# Patient Record
Sex: MALE | Race: BLACK OR AFRICAN AMERICAN | NOT HISPANIC OR LATINO | Employment: STUDENT | URBAN - METROPOLITAN AREA
[De-identification: names, ages, dates, MRNs, and addresses within clinical notes are randomized per-mention and may not be internally consistent; named-entity substitution may affect disease eponyms.]

---
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
## 2020-01-28 ENCOUNTER — HOSPITAL ENCOUNTER (EMERGENCY)
Facility: HOSPITAL | Age: 20
Discharge: HOME | End: 2020-01-28
Attending: EMERGENCY MEDICINE | Admitting: EMERGENCY MEDICINE

## 2020-01-28 VITALS
SYSTOLIC BLOOD PRESSURE: 138 MMHG | OXYGEN SATURATION: 100 % | BODY MASS INDEX: 25.19 KG/M2 | WEIGHT: 201.5 LBS | RESPIRATION RATE: 20 BRPM | TEMPERATURE: 100.7 F | HEART RATE: 107 BPM | DIASTOLIC BLOOD PRESSURE: 61 MMHG

## 2020-01-28 DIAGNOSIS — J06.9 VIRAL URI WITH COUGH: Primary | ICD-10-CM

## 2020-01-28 LAB
FLUAV RNA NPH QL NAA+PROBE: NORMAL
FLUBV RNA NPH QL NAA+PROBE: NORMAL
RSV RNA NPH QL NAA+PROBE: NORMAL

## 2020-01-28 PROCEDURE — 99283 EMERGENCY DEPT VISIT LOW MDM: CPT

## 2020-01-28 PROCEDURE — 87631 RESP VIRUS 3-5 TARGETS: CPT

## 2020-01-28 PROCEDURE — 99284 EMERGENCY DEPT VISIT MOD MDM: CPT | Performed by: PHYSICIAN ASSISTANT

## 2020-01-28 RX ORDER — ACETAMINOPHEN 325 MG/1
650 TABLET ORAL ONCE
Status: COMPLETED | OUTPATIENT
Start: 2020-01-28 | End: 2020-01-28

## 2020-01-28 RX ORDER — ONDANSETRON 4 MG/1
4 TABLET, ORALLY DISINTEGRATING ORAL ONCE
Status: COMPLETED | OUTPATIENT
Start: 2020-01-28 | End: 2020-01-28

## 2020-01-28 RX ADMIN — ONDANSETRON 4 MG: 4 TABLET, ORALLY DISINTEGRATING ORAL at 13:27

## 2020-01-28 RX ADMIN — ACETAMINOPHEN 650 MG: 325 TABLET, FILM COATED ORAL at 13:00

## 2020-01-28 NOTE — ED PROVIDER NOTES
History  Chief Complaint   Patient presents with    Flu Symptoms     Patient states this morning woke up with generalized aching, fever and nausea     23year old male presents with flu like symptoms x1 day  He woke up with fever, chills, nausea, and generalized body aches  He did not get the flu shot  No sick contacts  No sore throat  No ear pain  He has nasal congestion  No abdominal pain  No diarrhea or constipation  No headache, dizziness, lightheadedness, weakness  No change in appetite  No recent travel outside of the country  Prior to Admission Medications   Prescriptions Last Dose Informant Patient Reported? Taking?   benzocaine (HURRICAINE) 20 % Not Taking at Unknown time  No No   Si application by Mucosal route 4 (four) times a day as needed for mucositis   Patient not taking: Reported on 2020      Facility-Administered Medications: None       Past Medical History:   Diagnosis Date    Herpes stomatitis     Resolved 2014     Other headache syndrome     Resolved 2014        History reviewed  No pertinent surgical history  Family History   Problem Relation Age of Onset    No Known Problems Mother     No Known Problems Father      I have reviewed and agree with the history as documented  Social History     Tobacco Use    Smoking status: Never Smoker    Smokeless tobacco: Never Used   Substance Use Topics    Alcohol use: Yes     Comment: occassioanl    Drug use: Yes     Comment: synthetic marjuana        Review of Systems   Constitutional: Positive for chills and fever  HENT: Positive for congestion, rhinorrhea and sinus pressure  Negative for ear discharge, ear pain, sinus pain, sneezing and sore throat  Respiratory: Negative for cough and shortness of breath  Cardiovascular: Negative for chest pain, palpitations and leg swelling  Gastrointestinal: Positive for nausea  Negative for abdominal pain, constipation, diarrhea and vomiting     Musculoskeletal: Negative for back pain, gait problem, joint swelling and myalgias  Skin: Negative for color change, pallor, rash and wound  Neurological: Negative for dizziness, syncope, weakness, light-headedness, numbness and headaches  All other systems reviewed and are negative  Physical Exam  Physical Exam   Constitutional: He appears well-developed and well-nourished  No distress  HENT:   Head: Normocephalic and atraumatic  Right Ear: Hearing, tympanic membrane, external ear and ear canal normal  Tympanic membrane is not perforated, not erythematous, not retracted and not bulging  Left Ear: Hearing, tympanic membrane, external ear and ear canal normal  Tympanic membrane is not perforated, not erythematous, not retracted and not bulging  Nose: Nose normal    Mouth/Throat: Uvula is midline, oropharynx is clear and moist and mucous membranes are normal  No trismus in the jaw  No uvula swelling  No oropharyngeal exudate, posterior oropharyngeal edema, posterior oropharyngeal erythema or tonsillar abscesses  Eyes: EOM are normal    Neck: Normal range of motion  Neck supple  Cardiovascular: Normal rate, regular rhythm, normal heart sounds and intact distal pulses  Exam reveals no gallop and no friction rub  No murmur heard  Pulmonary/Chest: Effort normal and breath sounds normal  No stridor  No respiratory distress  He has no wheezes  He has no rales  Sp02 is 100% indicating adequate oxygenation on room air   Abdominal: Soft  Bowel sounds are normal  He exhibits no distension and no mass  There is no tenderness  There is no guarding  Abdomen soft, nontender, nondistended  No peritoneal signs   Skin: Skin is warm and dry  Capillary refill takes less than 2 seconds  No rash noted  He is not diaphoretic  No erythema  No pallor  Nursing note and vitals reviewed        Vital Signs  ED Triage Vitals [01/28/20 1253]   Temperature Pulse Respirations Blood Pressure SpO2   (!) 102 3 °F (39 1 °C) (!) 107 20 138/61 100 %      Temp Source Heart Rate Source Patient Position - Orthostatic VS BP Location FiO2 (%)   Tympanic Monitor Sitting Right arm --      Pain Score       9           Vitals:    01/28/20 1253   BP: 138/61   Pulse: (!) 107   Patient Position - Orthostatic VS: Sitting         Visual Acuity      ED Medications  Medications   acetaminophen (TYLENOL) tablet 650 mg (650 mg Oral Given 1/28/20 1300)   ondansetron (ZOFRAN-ODT) dispersible tablet 4 mg (4 mg Oral Given 1/28/20 1327)       Diagnostic Studies  Results Reviewed     Procedure Component Value Units Date/Time    Influenza A/B and RSV PCR [[id number]]  (Normal) Collected:  01/28/20 1301    Lab Status:  Final result Specimen:  Nose Updated:  01/28/20 1341     INFLUENZA A PCR None Detected     INFLUENZA B PCR None Detected     RSV PCR None Detected                 XR chest pa & lateral    (Results Pending)              Procedures  Procedures         ED Course                               MDM  Number of Diagnoses or Management Options  Viral URI with cough:   Diagnosis management comments: Suggested obtaining CXR to rule out pneumonia however patient did not wish to wait to get it done  Given outpatient script, will contact with results  Encouraged supportive treatment, educated how to alternate between tylenol and motrin as needed for fevers  Gave patient proper education regarding diagnosis  Answered all questions  Return to ED for any worsening of symptoms otherwise follow up with primary care physician for re-evaluation  Discussed plan with patient who verbalized understanding and agreed to plan         Amount and/or Complexity of Data Reviewed  Tests in the radiology section of CPT®: ordered  Tests in the medicine section of CPT®: ordered and reviewed  Discussion of test results with the performing providers: yes  Review and summarize past medical records: yes  Discuss the patient with other providers: yes  Independent visualization of images, tracings, or specimens: yes          Disposition  Final diagnoses:   Viral URI with cough     Time reflects when diagnosis was documented in both MDM as applicable and the Disposition within this note     Time User Action Codes Description Comment    1/28/2020  2:20 PM Art Sawyer Add [J06 9,  B97 89] Viral URI with cough       ED Disposition     ED Disposition Condition Date/Time Comment    Discharge Stable [place] Jan 28, 2020  2:20 PM Abel Harris discharge to home/self care  Follow-up Information     Follow up With Specialties Details Why Contact Info Additional [address] Medicine Call today to make follow up appointment for cold like symptoms, routine well checks [place] [facility] Emergency Department Emergency Medicine Go to  As needed [address] [facility]  [phone] [facility] [place], [place], [address]          Discharge Medication List as of 1/28/2020  2:21 PM      CONTINUE these medications which have NOT CHANGED    Details   benzocaine (HURRICAINE) 20 % 1 application by Mucosal route 4 (four) times a day as needed for mucositis, Starting Wed 11/6/2019, Print           Outpatient Discharge Orders   XR chest pa & lateral   Standing Status: Future Standing Exp   Date: 01/28/24       ED Provider  Electronically Signed by           Gloria Young PA-C  01/28/20 4178

## 2020-01-28 NOTE — ED NOTES
Patient states he no longer wants to wait to have his chest xray obtained  Tami Barillas, [facility] made aware       Edyta Ariza, RN  01/28/20 5716

## 2020-05-11 ENCOUNTER — HOSPITAL ENCOUNTER (EMERGENCY)
Facility: HOSPITAL | Age: 20
Discharge: HOME | End: 2020-05-11
Attending: EMERGENCY MEDICINE | Admitting: EMERGENCY MEDICINE

## 2020-05-11 VITALS
WEIGHT: 217.15 LBS | DIASTOLIC BLOOD PRESSURE: 93 MMHG | RESPIRATION RATE: 18 BRPM | OXYGEN SATURATION: 97 % | TEMPERATURE: 96.7 F | BODY MASS INDEX: 27.14 KG/M2 | HEART RATE: 98 BPM | SYSTOLIC BLOOD PRESSURE: 148 MMHG

## 2020-05-11 DIAGNOSIS — R10.9 ABDOMINAL CRAMPS: ICD-10-CM

## 2020-05-11 DIAGNOSIS — R19.7 DIARRHEA: Primary | ICD-10-CM

## 2020-05-11 PROCEDURE — 99283 EMERGENCY DEPT VISIT LOW MDM: CPT

## 2020-05-11 PROCEDURE — 99284 EMERGENCY DEPT VISIT MOD MDM: CPT | Performed by: EMERGENCY MEDICINE

## 2020-05-11 RX ORDER — DICYCLOMINE HCL 20 MG
20 TABLET ORAL 2 TIMES DAILY
Qty: 20 TABLET | Refills: 0 | Status: SHIPPED | OUTPATIENT
Start: 2020-05-11

## 2020-05-11 RX ORDER — LOPERAMIDE HYDROCHLORIDE 2 MG/1
2 CAPSULE ORAL 4 TIMES DAILY PRN
Qty: 12 CAPSULE | Refills: 0 | Status: SHIPPED | OUTPATIENT
Start: 2020-05-11

## 2021-10-13 ENCOUNTER — OFFICE VISIT (OUTPATIENT)
Dept: URGENT CARE | Facility: CLINIC | Age: 21
End: 2021-10-13
Payer: COMMERCIAL

## 2021-10-13 ENCOUNTER — APPOINTMENT (OUTPATIENT)
Dept: RADIOLOGY | Facility: CLINIC | Age: 21
End: 2021-10-13
Payer: COMMERCIAL

## 2021-10-13 VITALS
RESPIRATION RATE: 16 BRPM | DIASTOLIC BLOOD PRESSURE: 78 MMHG | BODY MASS INDEX: 30.12 KG/M2 | WEIGHT: 241 LBS | OXYGEN SATURATION: 98 % | SYSTOLIC BLOOD PRESSURE: 147 MMHG | TEMPERATURE: 97.5 F | HEART RATE: 84 BPM

## 2021-10-13 DIAGNOSIS — M25.561 ACUTE PAIN OF RIGHT KNEE: ICD-10-CM

## 2021-10-13 DIAGNOSIS — M25.561 ACUTE PAIN OF RIGHT KNEE: Primary | ICD-10-CM

## 2021-10-13 PROCEDURE — 73564 X-RAY EXAM KNEE 4 OR MORE: CPT

## 2021-10-13 PROCEDURE — 99213 OFFICE O/P EST LOW 20 MIN: CPT | Performed by: PHYSICIAN ASSISTANT

## 2022-03-10 ENCOUNTER — TELEPHONE (OUTPATIENT)
Dept: PSYCHIATRY | Facility: CLINIC | Age: 22
End: 2022-03-10

## 2022-04-01 ENCOUNTER — ANESTHESIA EVENT (EMERGENCY)
Dept: PERIOP | Facility: HOSPITAL | Age: 22
End: 2022-04-01
Payer: COMMERCIAL

## 2022-04-01 ENCOUNTER — ANESTHESIA (EMERGENCY)
Dept: PERIOP | Facility: HOSPITAL | Age: 22
End: 2022-04-01
Payer: COMMERCIAL

## 2022-04-01 ENCOUNTER — APPOINTMENT (EMERGENCY)
Dept: RADIOLOGY | Facility: HOSPITAL | Age: 22
End: 2022-04-01
Payer: COMMERCIAL

## 2022-04-01 ENCOUNTER — HOSPITAL ENCOUNTER (EMERGENCY)
Facility: HOSPITAL | Age: 22
Discharge: HOME | End: 2022-04-01
Attending: EMERGENCY MEDICINE
Payer: COMMERCIAL

## 2022-04-01 VITALS
HEART RATE: 67 BPM | RESPIRATION RATE: 16 BRPM | OXYGEN SATURATION: 99 % | TEMPERATURE: 98.2 F | SYSTOLIC BLOOD PRESSURE: 139 MMHG | DIASTOLIC BLOOD PRESSURE: 69 MMHG

## 2022-04-01 DIAGNOSIS — K35.80 ACUTE APPENDICITIS, UNCOMPLICATED: Primary | ICD-10-CM

## 2022-04-01 PROBLEM — R11.0 NAUSEA: Status: ACTIVE | Noted: 2022-04-01

## 2022-04-01 LAB
ANION GAP SERPL CALCULATED.3IONS-SCNC: 4 MMOL/L (ref 4–13)
BACTERIA UR QL AUTO: ABNORMAL /HPF
BASOPHILS # BLD AUTO: 0.02 THOUSANDS/ΜL (ref 0–0.1)
BASOPHILS NFR BLD AUTO: 0 % (ref 0–1)
BILIRUB UR QL STRIP: NEGATIVE
BUN SERPL-MCNC: 13 MG/DL (ref 5–25)
CALCIUM SERPL-MCNC: 9.5 MG/DL (ref 8.3–10.1)
CHLORIDE SERPL-SCNC: 102 MMOL/L (ref 100–108)
CLARITY UR: CLEAR
CO2 SERPL-SCNC: 28 MMOL/L (ref 21–32)
COLOR UR: YELLOW
CREAT SERPL-MCNC: 0.91 MG/DL (ref 0.6–1.3)
EOSINOPHIL # BLD AUTO: 0 THOUSAND/ΜL (ref 0–0.61)
EOSINOPHIL NFR BLD AUTO: 0 % (ref 0–6)
ERYTHROCYTE [DISTWIDTH] IN BLOOD BY AUTOMATED COUNT: 14.4 % (ref 11.6–15.1)
GFR SERPL CREATININE-BSD FRML MDRD: 119 ML/MIN/1.73SQ M
GLUCOSE SERPL-MCNC: 100 MG/DL (ref 65–140)
GLUCOSE UR STRIP-MCNC: NEGATIVE MG/DL
HCT VFR BLD AUTO: 46.3 % (ref 36.5–49.3)
HGB BLD-MCNC: 15.5 G/DL (ref 12–17)
HGB UR QL STRIP.AUTO: ABNORMAL
IMM GRANULOCYTES # BLD AUTO: 0.06 THOUSAND/UL (ref 0–0.2)
IMM GRANULOCYTES NFR BLD AUTO: 0 % (ref 0–2)
KETONES UR STRIP-MCNC: ABNORMAL MG/DL
LEUKOCYTE ESTERASE UR QL STRIP: NEGATIVE
LYMPHOCYTES # BLD AUTO: 1 THOUSANDS/ΜL (ref 0.6–4.47)
LYMPHOCYTES NFR BLD AUTO: 6 % (ref 14–44)
MCH RBC QN AUTO: 29.7 PG (ref 26.8–34.3)
MCHC RBC AUTO-ENTMCNC: 33.5 G/DL (ref 31.4–37.4)
MCV RBC AUTO: 89 FL (ref 82–98)
MONOCYTES # BLD AUTO: 1.29 THOUSAND/ΜL (ref 0.17–1.22)
MONOCYTES NFR BLD AUTO: 8 % (ref 4–12)
MUCOUS THREADS UR QL AUTO: ABNORMAL
NEUTROPHILS # BLD AUTO: 13.96 THOUSANDS/ΜL (ref 1.85–7.62)
NEUTS SEG NFR BLD AUTO: 86 % (ref 43–75)
NITRITE UR QL STRIP: NEGATIVE
NON-SQ EPI CELLS URNS QL MICRO: ABNORMAL /HPF
NRBC BLD AUTO-RTO: 0 /100 WBCS
PH UR STRIP.AUTO: 7.5 [PH] (ref 4.5–8)
PLATELET # BLD AUTO: 211 THOUSANDS/UL (ref 149–390)
PMV BLD AUTO: 10.6 FL (ref 8.9–12.7)
POTASSIUM SERPL-SCNC: 4.2 MMOL/L (ref 3.5–5.3)
PROT UR STRIP-MCNC: NEGATIVE MG/DL
RBC # BLD AUTO: 5.22 MILLION/UL (ref 3.88–5.62)
RBC #/AREA URNS AUTO: ABNORMAL /HPF
SODIUM SERPL-SCNC: 134 MMOL/L (ref 136–145)
SP GR UR STRIP.AUTO: 1.02 (ref 1–1.03)
UROBILINOGEN UR QL STRIP.AUTO: 1 E.U./DL
WBC # BLD AUTO: 16.33 THOUSAND/UL (ref 4.31–10.16)
WBC #/AREA URNS AUTO: ABNORMAL /HPF

## 2022-04-01 PROCEDURE — 74177 CT ABD & PELVIS W/CONTRAST: CPT

## 2022-04-01 PROCEDURE — 99285 EMERGENCY DEPT VISIT HI MDM: CPT

## 2022-04-01 PROCEDURE — 36415 COLL VENOUS BLD VENIPUNCTURE: CPT

## 2022-04-01 PROCEDURE — 96374 THER/PROPH/DIAG INJ IV PUSH: CPT

## 2022-04-01 PROCEDURE — 44970 LAPAROSCOPY APPENDECTOMY: CPT | Performed by: SURGERY

## 2022-04-01 PROCEDURE — 96375 TX/PRO/DX INJ NEW DRUG ADDON: CPT

## 2022-04-01 PROCEDURE — 99291 CRITICAL CARE FIRST HOUR: CPT | Performed by: EMERGENCY MEDICINE

## 2022-04-01 PROCEDURE — 88304 TISSUE EXAM BY PATHOLOGIST: CPT | Performed by: PATHOLOGY

## 2022-04-01 PROCEDURE — 99205 OFFICE O/P NEW HI 60 MIN: CPT | Performed by: SURGERY

## 2022-04-01 PROCEDURE — 81001 URINALYSIS AUTO W/SCOPE: CPT

## 2022-04-01 PROCEDURE — 85025 COMPLETE CBC W/AUTO DIFF WBC: CPT

## 2022-04-01 PROCEDURE — 80048 BASIC METABOLIC PNL TOTAL CA: CPT

## 2022-04-01 PROCEDURE — G1004 CDSM NDSC: HCPCS

## 2022-04-01 RX ORDER — ONDANSETRON 2 MG/ML
4 INJECTION INTRAMUSCULAR; INTRAVENOUS ONCE AS NEEDED
Status: DISCONTINUED | OUTPATIENT
Start: 2022-04-01 | End: 2022-04-01 | Stop reason: HOSPADM

## 2022-04-01 RX ORDER — ONDANSETRON 2 MG/ML
4 INJECTION INTRAMUSCULAR; INTRAVENOUS ONCE
Status: COMPLETED | OUTPATIENT
Start: 2022-04-01 | End: 2022-04-01

## 2022-04-01 RX ORDER — GLYCOPYRROLATE 0.2 MG/ML
INJECTION INTRAMUSCULAR; INTRAVENOUS AS NEEDED
Status: DISCONTINUED | OUTPATIENT
Start: 2022-04-01 | End: 2022-04-01

## 2022-04-01 RX ORDER — METRONIDAZOLE 500 MG/1
500 TABLET ORAL EVERY 8 HOURS SCHEDULED
Status: DISCONTINUED | OUTPATIENT
Start: 2022-04-01 | End: 2022-04-01

## 2022-04-01 RX ORDER — BUPIVACAINE HYDROCHLORIDE 5 MG/ML
INJECTION, SOLUTION PERINEURAL AS NEEDED
Status: DISCONTINUED | OUTPATIENT
Start: 2022-04-01 | End: 2022-04-01 | Stop reason: HOSPADM

## 2022-04-01 RX ORDER — OXYCODONE HYDROCHLORIDE 5 MG/1
5 TABLET ORAL EVERY 4 HOURS PRN
Status: DISCONTINUED | OUTPATIENT
Start: 2022-04-01 | End: 2022-04-01 | Stop reason: HOSPADM

## 2022-04-01 RX ORDER — ROCURONIUM BROMIDE 10 MG/ML
INJECTION, SOLUTION INTRAVENOUS AS NEEDED
Status: DISCONTINUED | OUTPATIENT
Start: 2022-04-01 | End: 2022-04-01

## 2022-04-01 RX ORDER — HYDROMORPHONE HCL/PF 1 MG/ML
0.5 SYRINGE (ML) INJECTION
Status: DISCONTINUED | OUTPATIENT
Start: 2022-04-01 | End: 2022-04-01 | Stop reason: HOSPADM

## 2022-04-01 RX ORDER — PROPOFOL 10 MG/ML
INJECTION, EMULSION INTRAVENOUS AS NEEDED
Status: DISCONTINUED | OUTPATIENT
Start: 2022-04-01 | End: 2022-04-01

## 2022-04-01 RX ORDER — DEXAMETHASONE SODIUM PHOSPHATE 10 MG/ML
INJECTION, SOLUTION INTRAMUSCULAR; INTRAVENOUS AS NEEDED
Status: DISCONTINUED | OUTPATIENT
Start: 2022-04-01 | End: 2022-04-01

## 2022-04-01 RX ORDER — OXYCODONE HYDROCHLORIDE 5 MG/1
5 TABLET ORAL EVERY 4 HOURS PRN
Qty: 15 TABLET | Refills: 0 | Status: SHIPPED | OUTPATIENT
Start: 2022-04-01 | End: 2022-04-11

## 2022-04-01 RX ORDER — OXYCODONE HYDROCHLORIDE 5 MG/1
2.5 TABLET ORAL EVERY 4 HOURS PRN
Status: DISCONTINUED | OUTPATIENT
Start: 2022-04-01 | End: 2022-04-01 | Stop reason: HOSPADM

## 2022-04-01 RX ORDER — METRONIDAZOLE 500 MG/1
500 TABLET ORAL ONCE
Status: DISCONTINUED | OUTPATIENT
Start: 2022-04-01 | End: 2022-04-01 | Stop reason: HOSPADM

## 2022-04-01 RX ORDER — MAGNESIUM HYDROXIDE 1200 MG/15ML
LIQUID ORAL AS NEEDED
Status: DISCONTINUED | OUTPATIENT
Start: 2022-04-01 | End: 2022-04-01 | Stop reason: HOSPADM

## 2022-04-01 RX ORDER — SODIUM CHLORIDE, SODIUM LACTATE, POTASSIUM CHLORIDE, CALCIUM CHLORIDE 600; 310; 30; 20 MG/100ML; MG/100ML; MG/100ML; MG/100ML
INJECTION, SOLUTION INTRAVENOUS CONTINUOUS PRN
Status: DISCONTINUED | OUTPATIENT
Start: 2022-04-01 | End: 2022-04-01

## 2022-04-01 RX ORDER — SUCCINYLCHOLINE/SOD CL,ISO/PF 100 MG/5ML
SYRINGE (ML) INTRAVENOUS AS NEEDED
Status: DISCONTINUED | OUTPATIENT
Start: 2022-04-01 | End: 2022-04-01

## 2022-04-01 RX ORDER — FENTANYL CITRATE/PF 50 MCG/ML
25 SYRINGE (ML) INJECTION
Status: DISCONTINUED | OUTPATIENT
Start: 2022-04-01 | End: 2022-04-01 | Stop reason: HOSPADM

## 2022-04-01 RX ORDER — ONDANSETRON 2 MG/ML
4 INJECTION INTRAMUSCULAR; INTRAVENOUS EVERY 6 HOURS PRN
Status: DISCONTINUED | OUTPATIENT
Start: 2022-04-01 | End: 2022-04-01 | Stop reason: HOSPADM

## 2022-04-01 RX ORDER — KETOROLAC TROMETHAMINE 30 MG/ML
15 INJECTION, SOLUTION INTRAMUSCULAR; INTRAVENOUS ONCE
Status: COMPLETED | OUTPATIENT
Start: 2022-04-01 | End: 2022-04-01

## 2022-04-01 RX ORDER — FENTANYL CITRATE 50 UG/ML
INJECTION, SOLUTION INTRAMUSCULAR; INTRAVENOUS AS NEEDED
Status: DISCONTINUED | OUTPATIENT
Start: 2022-04-01 | End: 2022-04-01

## 2022-04-01 RX ORDER — DEXMEDETOMIDINE HYDROCHLORIDE 100 UG/ML
INJECTION, SOLUTION INTRAVENOUS AS NEEDED
Status: DISCONTINUED | OUTPATIENT
Start: 2022-04-01 | End: 2022-04-01

## 2022-04-01 RX ORDER — MIDAZOLAM HYDROCHLORIDE 2 MG/2ML
INJECTION, SOLUTION INTRAMUSCULAR; INTRAVENOUS AS NEEDED
Status: DISCONTINUED | OUTPATIENT
Start: 2022-04-01 | End: 2022-04-01

## 2022-04-01 RX ORDER — HYDROMORPHONE HCL/PF 1 MG/ML
0.5 SYRINGE (ML) INJECTION EVERY 4 HOURS PRN
Status: DISCONTINUED | OUTPATIENT
Start: 2022-04-01 | End: 2022-04-01 | Stop reason: HOSPADM

## 2022-04-01 RX ORDER — SODIUM CHLORIDE, SODIUM GLUCONATE, SODIUM ACETATE, POTASSIUM CHLORIDE, MAGNESIUM CHLORIDE, SODIUM PHOSPHATE, DIBASIC, AND POTASSIUM PHOSPHATE .53; .5; .37; .037; .03; .012; .00082 G/100ML; G/100ML; G/100ML; G/100ML; G/100ML; G/100ML; G/100ML
100 INJECTION, SOLUTION INTRAVENOUS CONTINUOUS
Status: DISCONTINUED | OUTPATIENT
Start: 2022-04-01 | End: 2022-04-01 | Stop reason: HOSPADM

## 2022-04-01 RX ORDER — LIDOCAINE HYDROCHLORIDE 10 MG/ML
INJECTION, SOLUTION EPIDURAL; INFILTRATION; INTRACAUDAL; PERINEURAL AS NEEDED
Status: DISCONTINUED | OUTPATIENT
Start: 2022-04-01 | End: 2022-04-01

## 2022-04-01 RX ORDER — ACETAMINOPHEN 325 MG/1
975 TABLET ORAL EVERY 8 HOURS SCHEDULED
Status: DISCONTINUED | OUTPATIENT
Start: 2022-04-01 | End: 2022-04-01 | Stop reason: HOSPADM

## 2022-04-01 RX ADMIN — SUGAMMADEX 200 MG: 100 INJECTION, SOLUTION INTRAVENOUS at 16:33

## 2022-04-01 RX ADMIN — IOHEXOL 100 ML: 350 INJECTION, SOLUTION INTRAVENOUS at 12:40

## 2022-04-01 RX ADMIN — DEXMEDETOMIDINE HCL 20 MCG: 100 INJECTION INTRAVENOUS at 15:37

## 2022-04-01 RX ADMIN — SODIUM CHLORIDE, SODIUM LACTATE, POTASSIUM CHLORIDE, AND CALCIUM CHLORIDE: .6; .31; .03; .02 INJECTION, SOLUTION INTRAVENOUS at 15:01

## 2022-04-01 RX ADMIN — SODIUM CHLORIDE, SODIUM LACTATE, POTASSIUM CHLORIDE, AND CALCIUM CHLORIDE: .6; .31; .03; .02 INJECTION, SOLUTION INTRAVENOUS at 16:07

## 2022-04-01 RX ADMIN — Medication 25 MCG: at 17:05

## 2022-04-01 RX ADMIN — KETOROLAC TROMETHAMINE 15 MG: 30 INJECTION, SOLUTION INTRAMUSCULAR at 11:47

## 2022-04-01 RX ADMIN — PROPOFOL 300 MG: 10 INJECTION, EMULSION INTRAVENOUS at 15:31

## 2022-04-01 RX ADMIN — ROCURONIUM BROMIDE 50 MG: 50 INJECTION INTRAVENOUS at 15:39

## 2022-04-01 RX ADMIN — GLYCOPYRROLATE 0.1 MG: 0.2 INJECTION, SOLUTION INTRAMUSCULAR; INTRAVENOUS at 15:15

## 2022-04-01 RX ADMIN — ONDANSETRON 4 MG: 2 INJECTION INTRAMUSCULAR; INTRAVENOUS at 11:47

## 2022-04-01 RX ADMIN — FENTANYL CITRATE 50 MCG: 50 INJECTION INTRAMUSCULAR; INTRAVENOUS at 15:22

## 2022-04-01 RX ADMIN — LIDOCAINE HYDROCHLORIDE 50 MG: 10 INJECTION, SOLUTION EPIDURAL; INFILTRATION; INTRACAUDAL; PERINEURAL at 15:31

## 2022-04-01 RX ADMIN — ONDANSETRON 4 MG: 2 INJECTION INTRAMUSCULAR; INTRAVENOUS at 15:15

## 2022-04-01 RX ADMIN — FENTANYL CITRATE 50 MCG: 50 INJECTION INTRAMUSCULAR; INTRAVENOUS at 15:27

## 2022-04-01 RX ADMIN — Medication 200 MG: at 15:31

## 2022-04-01 RX ADMIN — MIDAZOLAM 2 MG: 1 INJECTION INTRAMUSCULAR; INTRAVENOUS at 15:15

## 2022-04-01 RX ADMIN — Medication 25 MCG: at 17:02

## 2022-04-01 RX ADMIN — CEFTRIAXONE SODIUM 2000 MG: 10 INJECTION, POWDER, FOR SOLUTION INTRAVENOUS at 14:51

## 2022-04-01 RX ADMIN — DEXAMETHASONE SODIUM PHOSPHATE 10 MG: 10 INJECTION, SOLUTION INTRAMUSCULAR; INTRAVENOUS at 15:52

## 2022-04-01 NOTE — DISCHARGE INSTRUCTIONS
Acute Care Surgery Discharge Instructions    Please follow-up as instructed  If you do not already have a follow-up appointment, please call the office when you leave to schedule an appointment to be seen in 2-3 weeks for post-operative re-evaluation  Activity:  - No lifting greater than 20 pounds or strenuous physical activity or exercise for 2 weeks  - Walking and normal light activities are encouraged  - Normal daily activities including climbing steps are okay  - No driving until no longer using pain medications  Return to work:    - You may return to work in 2 weeks or sooner if you are feeling well enough  Diet:    - You may resume your normal diet  Wound Care:  - May shower daily  No tub baths or swimming until cleared by your surgeon   - Wash incision gently with soap and water and pat dry  - Do not apply any creams or ointments unless instructed to do so by your surgeon   - Mutual Misa may apply ice as needed (no longer than 20 minutes at a time) for the first 48 hours  - Bruising is not unusual and will go away with a little time  You may apply a warm, moist compress that may help the bruising resolve quicker  - You may remove the dressings the day after surgery (unless otherwise instructed)  Leave any skin tapes (steri-strips) on the incision(s) in place until they fall off on their own  Any new dressings are optional     Medications:    - You may resume all of your regular medications after discharge unless otherwise instructed  Please refer to your discharge medication list for further details  - Please take the pain medications as directed  - You are encouraged to use non-narcotic pain medications first and whenever possible  Reserve the use of narcotic pain medication for moderate to severe pain not controlled by non-narcotic medications   - No driving while taking narcotic pain medications  - You may become constipated, especially if taking pain medications   You may take any over the counter stool softeners or laxatives as needed  Examples: Milk of Magnesia, Colace, Senna  Additional Instructions:  - If you have any questions or concerns after discharge please call the office   - Call office or return to ER if fever greater than 101, chills, persistent nausea/vomiting, worsening/uncontrollable pain, and/or increasing redness or purulent/foul smelling drainage from incision(s)

## 2022-04-01 NOTE — LETTER
[address] [address]  Dept: [phone]    April 1, 2022     Patient: Jonathon Farias   YOB: 2000   Date of Visit: 4/1/2022       To Whom it May Concern:    Denys Mckoy is under my professional care  He was seen in the hospital from 4/1/2022   to 04/01/22  He may return to work on 4/6/22 with the following limitations light duty - no lifting greater than 15-20 lbs for 2 weeks       If you have any questions or concerns, please don't hesitate to call           Sincerely,          David Brunson, DO

## 2022-04-01 NOTE — H&P
[address] [facility]  H&P- Jonathon Farias 2000, 25 y o  male MRN: [id number]  Unit/Bed#: ED 24 Encounter: [id number]  Primary Care Provider: Niesha Liz DO   Date and time admitted to hospital: 4/1/2022 10:36 AM    Nausea  Assessment & Plan  - secondary to acute appendicitis  - p r n  Medications  - no episode of vomiting while in ER    * Acute appendicitis  Assessment & Plan  - acute appendicitis; present on admission  - CT scan appreciated  - recommend admission and operative planning  - plan for laparoscopic appendectomy  - will place case request order  - start ceftriaxone 2 grams q 24 and Flagyl p o  500 mg  - will follow-up with postoperative check  - patient counseled on the importance of surgical procedure and is agreeable  - p r n  Pain support    DVT prophylaxis:  SCDs and Lovenox in the postoperative setting  PT and OT:  Not indicated    Disposition:  Admit to Surgical team at this time  Will place case request for at laparoscopic appendectomy  Start Rocephin and Flagyl preoperatively  History of Present Illness   HPI:  Jonathon Farias is a 25 y o  male who presents with acute onset of abdominal pain at approximately mid afternoon  On 03/31/2022  Patient states that the pain got worse tonight knee was unable to sleep well  He states the pain is approximately a 10/10  It states in the right lower quadrant  Nothing seems to make it better or worse  He has not taken any medications to assist   Patient states the pain is a sharp and stabbing pain  He has had no difficulty with bowel movements or urination  He endorses associated nausea without episodes of vomiting  Denies any new fevers, chills, sweats  States his appetite has been weak and he has not eaten anything since 8p m  last night  Patient denies any significant medical history or surgical history      Review of Systems   Constitutional: Negative for activity change, appetite change, chills, fatigue, fever and unexpected weight change  HENT: Negative for congestion and sore throat  Eyes: Negative  Respiratory: Negative  Negative for cough, chest tightness, shortness of breath and wheezing  Cardiovascular: Negative  Negative for chest pain and leg swelling  Gastrointestinal: Positive for abdominal pain and nausea  Negative for abdominal distention, constipation, diarrhea and vomiting  Endocrine: Negative  Genitourinary: Negative  Negative for difficulty urinating, dysuria, flank pain, frequency and hematuria  Musculoskeletal: Negative  Negative for arthralgias and back pain  Skin: Negative  Negative for color change, pallor, rash and wound  Allergic/Immunologic: Negative  Neurological: Negative  Negative for dizziness, syncope, weakness, light-headedness and headaches  Hematological: Negative  Psychiatric/Behavioral: Negative  Negative for agitation and confusion  Historical Information   Past Medical History:   Diagnosis Date    Herpes stomatitis     Resolved 2014     Other headache syndrome     Resolved 2014      History reviewed  No pertinent surgical history  Social History   Social History     Substance and Sexual Activity   Alcohol Use Yes    Comment: occassioanl     Social History     Substance and Sexual Activity   Drug Use Yes    Types: M[facility]    [facility]     Social History     Tobacco Use   Smoking Status Never Smoker   Smokeless Tobacco Never Used     Family History:   Family History   Problem Relation Age of Onset    No Known Problems Mother     No Known Problems Father        Meds/Allergies   PTA meds:   Prior to Admission Medications   Prescriptions Last Dose Informant Patient Reported?  Taking?   benzocaine (HURRICAINE) 20 %   No No   Si application by Mucosal route 4 (four) times a day as needed for mucositis   Patient not taking: Reported on 2020   dicyclomine (BENTYL) 20 mg tablet   No No   Sig: Take 1 tablet (20 mg total) by mouth 2 (two) times a day   Patient not taking: Reported on 10/13/2021   loperamide (IMODIUM) 2 mg capsule   No No   Sig: Take 1 capsule (2 mg total) by mouth 4 (four) times a day as needed for diarrhea   Patient not taking: Reported on 10/13/2021      Facility-Administered Medications: None     No Known Allergies    Objective   First Vitals:   Blood Pressure: 162/80 (04/01/22 1043)  Pulse: (!) 110 (04/01/22 1043)  Temperature: 98 4 °F (36 9 °C) (04/01/22 1043)  Temp Source: Oral (04/01/22 1043)  Respirations: 20 (04/01/22 1043)  SpO2: 98 % (04/01/22 1043)    Current Vitals:   Blood Pressure: 162/80 (04/01/22 1043)  Pulse: (!) 110 (04/01/22 1043)  Temperature: 98 4 °F (36 9 °C) (04/01/22 1043)  Temp Source: Oral (04/01/22 1043)  Respirations: 20 (04/01/22 1043)  SpO2: 98 % (04/01/22 1043)    No intake or output data in the 24 hours ending 04/01/22 1342    Invasive Devices  Report    Peripheral Intravenous Line            Peripheral IV 04/01/22 Left Antecubital <1 day                Physical Exam  Vitals reviewed  Constitutional:       Appearance: Normal appearance  HENT:      Head: Normocephalic and atraumatic  Nose: Nose normal       Mouth/Throat:      Pharynx: Oropharynx is clear  Cardiovascular:      Rate and Rhythm: Normal rate and regular rhythm  Pulses: Normal pulses  Heart sounds: Normal heart sounds  Pulmonary:      Effort: Pulmonary effort is normal       Breath sounds: Normal breath sounds  Abdominal:      General: There is no distension  Palpations: Abdomen is soft  Tenderness: There is abdominal tenderness  There is guarding  There is no right CVA tenderness or rebound  Comments: Tenderness to the right lower quadrant  Positive psoas sign  Musculoskeletal:         General: No swelling or tenderness  Normal range of motion  Cervical back: Normal range of motion and neck supple  Skin:     General: Skin is warm and dry  Neurological:      General: No focal deficit present  Mental Status: He is alert and oriented to person, place, and time  Lab Results:   I have personally reviewed pertinent lab results  , CBC:   Lab Results   Component Value Date    WBC 16 33 (H) 04/01/2022    HGB 15 5 04/01/2022    HCT 46 3 04/01/2022    MCV 89 04/01/2022     04/01/2022    MCH 29 7 04/01/2022    MCHC 33 5 04/01/2022    RDW 14 4 04/01/2022    MPV 10 6 04/01/2022    NRBC 0 04/01/2022   , CMP:   Lab Results   Component Value Date    SODIUM 134 (L) 04/01/2022    K 4 2 04/01/2022     04/01/2022    CO2 28 04/01/2022    BUN 13 04/01/2022    CREATININE 0 91 04/01/2022    CALCIUM 9 5 04/01/2022    EGFR 119 04/01/2022     Imaging: I have personally reviewed pertinent reports  EKG, Pathology, and Other Studies: I have personally reviewed pertinent reports  Code Status: Level 1 - Full Code  Advance Directive and Living Will:      Power of :    POLST:      Counseling / Coordination of Care  Total floor / unit time spent today 33 minutes  This involved direct patient contact where I performed a full history and physical, reviewed previous records, and reviewed laboratory and other diagnostic studies  Greater than 50% of total time was spent with the patient and / or family counseling and / or coordination of care      Irlanda Henry PA-C  4/1/2022

## 2022-04-01 NOTE — ED ATTENDING ATTESTATION
4/1/2022  ISamir DO, saw and evaluated the patient  I have discussed the patient with the resident/non-physician practitioner and agree with the resident's/non-physician practitioner's findings, Plan of Care, and MDM as documented in the resident's/non-physician practitioner's note, except where noted  All available labs and Radiology studies were reviewed  I was present for key portions of any procedure(s) performed by the resident/non-physician practitioner and I was immediately available to provide assistance  At this point I agree with the current assessment done in the Emergency Department  I have conducted an independent evaluation of this patient a history and physical is as follows:    Patient is a 24-year-old male about 2:00 p m  yesterday noticed the onset of some right lower abdominal pain which since then has gotten worse  Relatively constant, worse with twisting or bending or moving  Had some nausea but no vomiting  One episode of loose stool yesterday about 3:00 p m , none since then  Has had normal bowel movements  Last oral intake was about 8:00 p m  Last evening  Mild anorexia  No dysuria or hematuria, no chest pain or palpitations, no sick contacts  General:  Patient is well-appearing  Head:  Atraumatic  Eyes:  Conjunctiva pink  ENT:  Mucous membranes are moist  Neck:  Supple  Cardiac:  S1-S2, without murmurs  Lungs:  Clear to auscultation bilaterally  Abdomen:  Right lower abdominal tenderness, no tympany, no rigidity, no guarding, no CVA tenderness  Extremities:  Normal range of motion  Neurologic:  Awake, fluent speech, normal comprehension, AAOx3  Skin:  Pink warm and dry  Psychiatric:  Alert, pleasant, cooperative        ED Course     CT abdomen pelvis with contrast   Final Result      Acute uncomplicated appendicitis  Pertinent findings on this study conveyed by myself to Fouzia Segovia on 4/1/2022 at 12:59 via [facility] with prompt response  Workstation performed: [id number]             Labs Reviewed   CBC AND DIFFERENTIAL - Abnormal       Result Value Ref Range Status    WBC 16 33 (*) 4 31 - 10 16 Thousand/uL Final    RBC 5 22  3 88 - 5 62 Million/uL Final    Hemoglobin 15 5  12 0 - 17 0 g/dL Final    Hematocrit 46 3  36 5 - 49 3 % Final    MCV 89  82 - 98 fL Final    MCH 29 7  26 8 - 34 3 pg Final    MCHC 33 5  31 4 - 37 4 g/dL Final    RDW 14 4  11 6 - 15 1 % Final    MPV 10 6  8 9 - 12 7 fL Final    Platelets 202  385 - 390 Thousands/uL Final    nRBC 0  /100 WBCs Final    Neutrophils Relative 86 (*) 43 - 75 % Final    Immat GRANS % 0  0 - 2 % Final    Lymphocytes Relative 6 (*) 14 - 44 % Final    Monocytes Relative 8  4 - 12 % Final    Eosinophils Relative 0  0 - 6 % Final    Basophils Relative 0  0 - 1 % Final    Neutrophils Absolute 13 96 (*) 1 85 - 7 62 Thousands/µL Final    Immature Grans Absolute 0 06  0 00 - 0 20 Thousand/uL Final    Lymphocytes Absolute 1 00  0 60 - 4 47 Thousands/µL Final    Monocytes Absolute 1 29 (*) 0 17 - 1 22 Thousand/µL Final    Eosinophils Absolute 0 00  0 00 - 0 61 Thousand/µL Final    Basophils Absolute 0 02  0 00 - 0 10 Thousands/µL Final   BASIC METABOLIC PANEL - Abnormal    Sodium 134 (*) 136 - 145 mmol/L Final    Potassium 4 2  3 5 - 5 3 mmol/L Final    Chloride 102  100 - 108 mmol/L Final    CO2 28  21 - 32 mmol/L Final    ANION GAP 4  4 - 13 mmol/L Final    BUN 13  5 - 25 mg/dL Final    Creatinine 0 91  0 60 - 1 30 mg/dL Final    Comment: Standardized to IDMS reference method    Glucose 100  65 - 140 mg/dL Final    Comment: If the patient is fasting, the ADA then defines impaired fasting glucose as > 100 mg/dL and diabetes as > or equal to 123 mg/dL  Specimen collection should occur prior to Sulfasalazine administration due to the potential for falsely depressed results  Specimen collection should occur prior to Sulfapyridine administration due to the potential for falsely elevated results      Calcium 9 5 8 3 - 10 1 mg/dL Final    eGFR 119  ml/min/1 73sq m Final    Narrative:     [place] guidelines for Chronic Kidney Disease (CKD):     Stage 1 with normal or high GFR (GFR > 90 mL/min/1 73 square meters)    Stage 2 Mild CKD (GFR = 60-89 mL/min/1 73 square meters)    Stage 3A Moderate CKD (GFR = 45-59 mL/min/1 73 square meters)    Stage 3B Moderate CKD (GFR = 30-44 mL/min/1 73 square meters)    Stage 4 Severe CKD (GFR = 15-29 mL/min/1 73 square meters)    Stage 5 End Stage CKD (GFR <15 mL/min/1 73 square meters)  Note: GFR calculation is accurate only with a steady state creatinine     CT abdomen pelvis with contrast   Final Result      Acute uncomplicated appendicitis  Pertinent findings on this study conveyed by myself to Tommy Shelton on 4/1/2022 at 12:59 via [address] [place] [address] with prompt response  Workstation performed: [id number]               Patient presented with symptoms strongly suggest acute appendicitis  CT shows acute appendicitis  On reassessment, no change the above findings  General surgery saw evaluate the patient, recommended admission to their service, they are going to take the patient to the operating room    Critical Care Time  Procedures  I consider this patient to be critically ill given his acute appendicitis requiring surgical treatment  If left untreated, would result in most likely perforation with peritonitis and probable death  35 minutes critical care time    Please see my separate procedure note for details

## 2022-04-01 NOTE — ASSESSMENT & PLAN NOTE
- acute appendicitis; present on admission  - CT scan appreciated  - recommend admission and operative planning  - plan for laparoscopic appendectomy  - will place case request order  - start ceftriaxone 2 grams q 24 and Flagyl p o  500 mg  - will follow-up with postoperative check  - patient counseled on the importance of surgical procedure and is agreeable  - p r n   Pain support

## 2022-04-01 NOTE — OP NOTE
OPERATIVE REPORT  PATIENT NAME: Juliana Hillman    :  2000  MRN: [id number]  Pt Location: BE OR ROOM 15    SURGERY DATE: 2022    Surgeon(s) and Role:     * Marisa Crandall DO - Primary     * Lula Nelson DO - Assisting     * Karie Holman DO    Preop Diagnosis:  Acute appendicitis, uncomplicated [S51 17]    Post-Op Diagnosis Codes:     * Acute appendicitis, uncomplicated [E63 73]    Procedure(s) (LRB):  APPENDECTOMY LAPAROSCOPIC open appendectomy (N/A)    Specimen(s):  ID Type Source Tests Collected by Time Destination   1 :  Tissue Appendix TISSUE EXAM Marisa Crandall DO 2022 1604        Estimated Blood Loss:   Minimal    Drains:  * No LDAs found *    Anesthesia Type:   Choice    Operative Indications:  Acute appendicitis, uncomplicated [U24 75]  25 y o  M who presents with one day of RLQ abdominal pain with associated nausea and vomiting  Imaging findings consistent with acute appendicitis  Risks, benefits, alternatives of laparoscopic appendectomy were discussed with the patient  Patient was agreeable to proceed with appendectomy  Operative Findings:  Acutely inflamed non-perforated appendix with small amount of purulent fluid in the pelvis     Complications:   None    Procedure and Technique:  Patient was transported to the operating room and placed in the supine position on the operating room table  General endotracheal anesthesia was induced  Patient was prepped and draped in the usual sterile fashion  A time-out was performed to confirm the correct patient, procedure, appropriate sterile indicators  Attention was turned to the abdomen where a 12 mm supraumbilical incision was made with a 15 blade scalpel  Dissection was taken down through the skin subcutaneous tissue and the fascia was grasped with Kocher's x2  The fascia was incised with a 15 blade scalpel and the abdomen was entered via a Varghese technique    A 12 mm port was placed through this incision fascia  CO2 insufflation was initiated which the patient tolerated  The laparoscope was inserted and the surrounding structures were visualized for entry upon trocar insertion and none were appreciated  An additional 5 mm port was placed suprapubically under direct visualization, and care was taken to avoid injury to the bladder  An additional 5 mm port was placed in the left lower quadrant under direct visualization  The small bowel was medialized with 2 blunt graspers which revealed the appendix  The appendix was noted to be adhered to the posterior abdomen and folded on itself from dense surrounding inflammation  The appendix was freed from the posterior abdomen with blunt dissection using blunt laparoscopic graspers  The base of the appendix was visualized and the mesoappendix was dissected with the harmonic device  Adequate hemostasis was achieved  Two Endoloops were placed at the base of the appendix  The Harmonic was used to transect the appendix distal to the Endoloops  The appendix was then placed in the Endo-Catch bag and removed from the abdomen via the 12 mm incision  The right lower quadrant was irrigated and suctioned  There was purulence fluid noted to be in the pelvis which was suction and irrigated  Attention was then turned to the Endoloops and the mesoappendix which were noted to be hemostatic  The ports were then removed from the abdomen  A 12 mm incision fascia was closed with 0 Vicryl on a UR6 in a figure-of-eight fashion  The skin was then closed with 4 Monocryl  The incisions were covered with Exofin skin glue  All counts were correct at the end the case  Patient was extubated per Anesthesia and transported to PACU in stable condition        Dr Carol Wheatley and Kaye Solorzano were present for the entire procedure    Patient Disposition:  PACU       SIGNATURE: Gissel Mccoy DO  DATE: April 1, 2022  TIME: 4:45 PM

## 2022-04-01 NOTE — ED PROCEDURE NOTE
PROCEDURE  CriticalCare Time  Performed by: Katie German DO  Authorized by: Katie German DO     Critical care provider statement:     Critical care time (minutes):  35    Critical care time was exclusive of:  Separately billable procedures and treating other patients and teaching time    Critical care was necessary to treat or prevent imminent or life-threatening deterioration of the following conditions: acute appendicitis      Critical care was time spent personally by me on the following activities:  Blood draw for specimens, obtaining history from patient or surrogate, development of treatment plan with patient or surrogate, discussions with consultants, evaluation of patient's response to treatment, re-evaluation of patient's condition, ordering and review of radiographic studies and ordering and review of laboratory studies    I assumed direction of critical care for this patient from another provider in my specialty: no           Katie German DO  04/01/22 [id number]

## 2022-04-01 NOTE — ED PROVIDER NOTES
History  Chief Complaint   Patient presents with    Abdominal Pain     Lower right quadrant pain began suddenly yesterday afternoon  Patient is a 24 y/o M with no sig PMH presenting with RLQ abdominal pain  Patient states pain started around 1pm yesterday, sharp stabbing pain that has been constant since onset  Pain worse with movement, states he can't even bend over without pain  Pain has been in RLQ entire time, no migration  Reports nausea, anorexia but no vomiting or diarrhea  Denies fevers, chills, dysuria/hematuria  No prior abdominal surgeries  Prior to Admission Medications   Prescriptions Last Dose Informant Patient Reported? Taking?   benzocaine (HURRICAINE) 20 %   No No   Si application by Mucosal route 4 (four) times a day as needed for mucositis   Patient not taking: Reported on 2020   dicyclomine (BENTYL) 20 mg tablet   No No   Sig: Take 1 tablet (20 mg total) by mouth 2 (two) times a day   Patient not taking: Reported on 10/13/2021   loperamide (IMODIUM) 2 mg capsule   No No   Sig: Take 1 capsule (2 mg total) by mouth 4 (four) times a day as needed for diarrhea   Patient not taking: Reported on 10/13/2021      Facility-Administered Medications: None       Past Medical History:   Diagnosis Date    Herpes stomatitis     Resolved 2014     Other headache syndrome     Resolved 2014        History reviewed  No pertinent surgical history  Family History   Problem Relation Age of Onset    No Known Problems Mother     No Known Problems Father      I have reviewed and agree with the history as documented  E-Cigarette/Vaping    E-Cigarette Use Never User      E-Cigarette/Vaping Substances    Nicotine No     THC No     CBD No     Flavoring No     Other No     Unknown No      Social History     Tobacco Use    Smoking status: Never Smoker    Smokeless tobacco: Never Used   Vaping Use    Vaping Use: Never used   Substance Use Topics    Alcohol use:  Yes Comment: occassioanl    Drug use: Yes     Types: Marijuana     Comment: synthetic marjuana        Review of Systems   Constitutional: Negative for chills and fever  HENT: Negative for ear pain and sore throat  Eyes: Negative for pain and visual disturbance  Respiratory: Negative for cough and shortness of breath  Cardiovascular: Negative for chest pain and palpitations  Gastrointestinal: Positive for abdominal pain and nausea  Negative for vomiting  Genitourinary: Negative for dysuria and hematuria  Musculoskeletal: Negative for arthralgias and back pain  Skin: Negative for color change and rash  Neurological: Negative for seizures and syncope  All other systems reviewed and are negative  Physical Exam  ED Triage Vitals [04/01/22 1043]   Temperature Pulse Respirations Blood Pressure SpO2   98 4 °F (36 9 °C) (!) 110 20 162/80 98 %      Temp Source Heart Rate Source Patient Position - Orthostatic VS BP Location FiO2 (%)   Oral Monitor Sitting Right arm --      Pain Score       10 - Worst Possible Pain             Orthostatic Vital Signs  Vitals:    04/01/22 1043   BP: 162/80   Pulse: (!) 110   Patient Position - Orthostatic VS: Sitting       Physical Exam  Vitals and nursing note reviewed  Constitutional:       General: He is not in acute distress  Appearance: He is well-developed  He is not toxic-appearing  HENT:      Head: Normocephalic and atraumatic  Right Ear: External ear normal       Left Ear: External ear normal       Nose: Nose normal       Mouth/Throat:      Pharynx: Oropharynx is clear  No oropharyngeal exudate or posterior oropharyngeal erythema  Eyes:      Extraocular Movements: Extraocular movements intact  Conjunctiva/sclera: Conjunctivae normal       Pupils: Pupils are equal, round, and reactive to light  Cardiovascular:      Rate and Rhythm: Normal rate and regular rhythm  Pulses: Normal pulses  Heart sounds: Normal heart sounds   No murmur heard   No friction rub  No gallop  Pulmonary:      Effort: Pulmonary effort is normal  No respiratory distress  Breath sounds: Normal breath sounds  No wheezing, rhonchi or rales  Abdominal:      General: Abdomen is flat  Bowel sounds are normal       Palpations: Abdomen is soft  Tenderness: There is abdominal tenderness in the right lower quadrant  There is guarding  There is no right CVA tenderness, left CVA tenderness or rebound  Negative signs include Rovsing's sign  Hernia: No hernia is present  Musculoskeletal:         General: Normal range of motion  Cervical back: Normal range of motion  No rigidity  Right lower leg: No edema  Left lower leg: No edema  Skin:     General: Skin is warm and dry  Capillary Refill: Capillary refill takes less than 2 seconds  Neurological:      General: No focal deficit present  Mental Status: He is alert     Psychiatric:         Mood and Affect: Mood normal          ED Medications  Medications   cefTRIAXone (ROCEPHIN) 2,000 mg in dextrose 5 % 50 mL IVPB ( Intravenous Dose Auto Held 4/4/22 1330)   metroNIDAZOLE (FLAGYL) tablet 500 mg ( Oral MAR Hold 4/1/22 1500)   ondansetron (ZOFRAN) injection 4 mg ( Intravenous MAR Hold 4/1/22 1500)   oxyCODONE (ROXICODONE) IR tablet 2 5 mg ( Oral MAR Hold 4/1/22 1500)   oxyCODONE (ROXICODONE) IR tablet 5 mg ( Oral MAR Hold 4/1/22 1500)   HYDROmorphone (DILAUDID) injection 0 5 mg ( Intravenous MAR Hold 4/1/22 1500)   acetaminophen (TYLENOL) tablet 975 mg ( Oral Dose Auto Held 4/4/22 2200)   multi-electrolyte (PLASMALYTE-A/ISOLYTE-S PH 7 4) IV solution (has no administration in time range)   fentaNYL (SUBLIMAZE) injection 25 mcg (has no administration in time range)   HYDROmorphone (DILAUDID) injection 0 5 mg (has no administration in time range)   ondansetron (ZOFRAN) injection 4 mg (has no administration in time range)   ondansetron (ZOFRAN) injection 4 mg (4 mg Intravenous Given 4/1/22 1147) ketorolac (TORADOL) injection 15 mg (15 mg Intravenous Given 4/1/22 1147)   iohexol (OMNIPAQUE) 350 MG/ML injection (SINGLE-DOSE) 100 mL (100 mL Intravenous Given 4/1/22 1240)       Diagnostic Studies  Results Reviewed     Procedure Component Value Units Date/Time    Urine Microscopic [[id number]]  (Abnormal) Collected: 04/01/22 1316    Lab Status: Final result Specimen: Urine, Other Updated: 04/01/22 1348     RBC, UA None Seen /hpf      WBC, UA 1-2 /hpf      Epithelial Cells None Seen /hpf      Bacteria, UA None Seen /hpf      MUCUS THREADS Occasional    Urine Macroscopic, POC [[id number]]  (Abnormal) Collected: 04/01/22 1316    Lab Status: Final result Specimen: Urine Updated: 04/01/22 1317     Color, UA Yellow     Clarity, UA Clear     pH, UA 7 5     Leukocytes, UA Negative     Nitrite, UA Negative     Protein, UA Negative mg/dl      Glucose, UA Negative mg/dl      Ketones, UA Trace mg/dl      Urobilinogen, UA 1 0 E U /dl      Bilirubin, UA Negative     Blood, UA Trace     Specific Ferdinand, UA 1 025    Narrative:      CLINITEK RESULT    Basic metabolic panel [[id number]]  (Abnormal) Collected: 04/01/22 1146    Lab Status: Final result Specimen: Blood from Arm, Left Updated: 04/01/22 1224     Sodium 134 mmol/L      Potassium 4 2 mmol/L      Chloride 102 mmol/L      CO2 28 mmol/L      ANION GAP 4 mmol/L      BUN 13 mg/dL      Creatinine 0 91 mg/dL      Glucose 100 mg/dL      Calcium 9 5 mg/dL      eGFR 119 ml/min/1 73sq m     Narrative:      [place] guidelines for Chronic Kidney Disease (CKD):     Stage 1 with normal or high GFR (GFR > 90 mL/min/1 73 square meters)    Stage 2 Mild CKD (GFR = 60-89 mL/min/1 73 square meters)    Stage 3A Moderate CKD (GFR = 45-59 mL/min/1 73 square meters)    Stage 3B Moderate CKD (GFR = 30-44 mL/min/1 73 square meters)    Stage 4 Severe CKD (GFR = 15-29 mL/min/1 73 square meters)    Stage 5 End Stage CKD (GFR <15 mL/min/1 73 square meters)  Note: GFR calculation is accurate only with a steady state creatinine    CBC and differential [[id number]]  (Abnormal) Collected: 04/01/22 1146    Lab Status: Final result Specimen: Blood from Arm, Left Updated: 04/01/22 1200     WBC 16 33 Thousand/uL      RBC 5 22 Million/uL      Hemoglobin 15 5 g/dL      Hematocrit 46 3 %      MCV 89 fL      MCH 29 7 pg      MCHC 33 5 g/dL      RDW 14 4 %      MPV 10 6 fL      Platelets 762 Thousands/uL      nRBC 0 /100 WBCs      Neutrophils Relative 86 %      Immat GRANS % 0 %      Lymphocytes Relative 6 %      Monocytes Relative 8 %      Eosinophils Relative 0 %      Basophils Relative 0 %      Neutrophils Absolute 13 96 Thousands/µL      Immature Grans Absolute 0 06 Thousand/uL      Lymphocytes Absolute 1 00 Thousands/µL      Monocytes Absolute 1 29 Thousand/µL      Eosinophils Absolute 0 00 Thousand/µL      Basophils Absolute 0 02 Thousands/µL                  CT abdomen pelvis with contrast   Final Result by Irma Faria MD (04/01 1303)      Acute uncomplicated appendicitis  Pertinent findings on this study conveyed by myself to Juanna Crigler on 4/1/2022 at 12:59 via [employer] with prompt response  Workstation performed: [id number]               Procedures  Procedures      ED Course  ED Course as of 04/01/22 1659   Fri Apr 01, 2022   1201 WBC(!): 16 33   1302 D/w red surgery AP, they are aware of pt with appendicitis                                       MDM  Number of Diagnoses or Management Options  Acute appendicitis, uncomplicated  Diagnosis management comments: 24 y/o M presenting with acute onset RLQ, history and physical exam concerning for appendicitis  WBC elevated to 16k, CT ordered which was read concerning for acute uncomplicated appendicitis  Discussed with red surgery who agreed to evaluate pt  Pt admitted to acute care surgery, pt understanding and agreeable         Disposition  Final diagnoses:   Acute appendicitis, uncomplicated     Time reflects when diagnosis was documented in both MDM as applicable and the Disposition within this note     Time User Action Codes Description Comment    4/1/2022  1:03 PM Riki Araujo Add [B51 80] Acute appendicitis, uncomplicated     6/9/2474  4:04 PM Terri Germano Modify [K35 80] Acute appendicitis, uncomplicated     8/6/3556  4:54 PM Ashley Shepard Modify [W45 80] Acute appendicitis, uncomplicated       ED Disposition     ED Disposition Condition Date/Time Comment    Admit Stable Fri Apr 1, 2022  1:03 PM Case was discussed with Fabricio Gomes and the patient's admission status was agreed to be Admission Status: inpatient status to the service of Dr Isai Carolina   Follow-up Information    None         Current Discharge Medication List      START taking these medications    Details   oxyCODONE (ROXICODONE) 5 immediate release tablet Take 1 tablet (5 mg total) by mouth every 4 (four) hours as needed for severe pain for up to 10 days Max Daily Amount: 30 mg  Qty: 15 tablet, Refills: 0    Associated Diagnoses: Acute appendicitis, uncomplicated         CONTINUE these medications which have NOT CHANGED    Details   benzocaine (HURRICAINE) 20 % 1 application by Mucosal route 4 (four) times a day as needed for mucositis  Qty: 1 each, Refills: 0    Comments: Dispense one tube  Associated Diagnoses: Facial injury, initial encounter      dicyclomine (BENTYL) 20 mg tablet Take 1 tablet (20 mg total) by mouth 2 (two) times a day  Qty: 20 tablet, Refills: 0    Associated Diagnoses: Diarrhea      loperamide (IMODIUM) 2 mg capsule Take 1 capsule (2 mg total) by mouth 4 (four) times a day as needed for diarrhea  Qty: 12 capsule, Refills: 0    Associated Diagnoses: Diarrhea           No discharge procedures on file  PDMP Review       Value Time User    PDMP Reviewed  Yes 11/9/2019  3:26 AM Simin Brennan PA-C           ED Provider  Attending physically available and evaluated Bowen Ennis I managed the patient along with the ED Attending      Electronically Signed by         Trevor Alcantar MD  04/01/22 5632

## 2022-04-01 NOTE — ANESTHESIA PREPROCEDURE EVALUATION
Procedure:  APPENDECTOMY LAPAROSCOPIC open appendectomy (N/A Abdomen)    Relevant Problems   No relevant active problems        Physical Exam    Airway    Mallampati score: I  TM Distance: >3 FB  Neck ROM: full     Dental       Cardiovascular      Pulmonary      Other Findings        Anesthesia Plan  ASA Score- 1     Anesthesia Type- general with ASA Monitors  Additional Monitors:   Airway Plan:     Comment: General anesthesia, endotracheal tube; standard ASA monitors  Risks and benefits discussed with patient; patient consented and agrees to proceed  I saw and evaluated the patient  If seen with CRNA, we have discussed the anesthetic plan and I am in agreement that the plan is appropriate for the patient          Plan Factors-    Chart reviewed  Existing labs reviewed  Induction- intravenous  Postoperative Plan- Plan for postoperative opioid use  Planned trial extubation    Informed Consent- Anesthetic plan and risks discussed with patient  I personally reviewed this patient with the CRNA  Discussed and agreed on the Anesthesia Plan with the CRNA  Leigh Ann Russ

## 2022-04-01 NOTE — ANESTHESIA POSTPROCEDURE EVALUATION
Post-Op Assessment Note    CV Status:  Stable  Pain Score: 0    Pain management: adequate     Mental Status:  Alert and awake   Hydration Status:  Euvolemic   PONV Controlled:  Controlled   Airway Patency:  Patent      Post Op Vitals Reviewed: Yes      Staff: CRNA         No complications documented      BP   119/76   Temp   97 4   Pulse 82   Resp   16   SpO2   96% ra

## 2022-04-05 NOTE — UTILIZATION REVIEW
Initial Clinical Review    Admission: Date/Time/Statement:   No orders of the defined types were placed in this encounter  ED Arrival Information     Expected Arrival Acuity    - 4/1/2022 10:24 Urgent         Means of arrival Escorted by Service Admission type    [facility] Urgent         Arrival complaint    flank pain        Chief Complaint   Patient presents with    Abdominal Pain     Lower right quadrant pain began suddenly yesterday afternoon  Initial Presentation:  25 YOM who presents with appendicitis, pain starting within preceding 24 hours  On exam he has tenderness in the RLQ, no guarding or rebound  +Rovsing sign  +Leukocytosis and CT scan shows acute uncomplicated appendicitis  Placed in Outpatient No Charge bed status, taken to OR    To OR for:  APPENDECTOMY LAPAROSCOPIC open appendectomy   Operative Findings:  Acutely inflamed non-perforated appendix with small amount of purulent fluid in the pelvis       ED Triage Vitals [04/01/22 1043]   Temperature Pulse Respirations Blood Pressure SpO2   98 4 °F (36 9 °C) (!) 110 20 162/80 98 %      Temp Source Heart Rate Source Patient Position - Orthostatic VS BP Location FiO2 (%)   Oral Monitor Sitting Right arm --      Pain Score       10 - Worst Possible Pain          Wt Readings from Last 1 Encounters:   10/13/21 109 kg (241 lb)     Additional Vital Signs:   04/01/22 1730 98 2 °F (36 8 °C) 67 16 139/69 -- 99 % None (Room air) -- --   04/01/22 1719 -- -- -- -- -- -- -- X --   04/01/22 1715 -- 78 20 139/74 102 100 % None (Room air) -- --   04/01/22 1700 -- 66 20 129/79 96 100 % None (Room air) -- --   04/01/22 1645 97 4 °F (36 3 °C) Abnormal  66 18 119/76 101 98 % None (Room air) X --   04/01/22 1043 98 4 °F (36 9 °C) 110 Abnormal  20 162/80 -- 98 % None (Room air) -- Sitting     Pertinent Labs/Diagnostic Test Results:   CT abdomen pelvis with contrast   Final Result  (04/01 1303)      Acute uncomplicated appendicitis  Results from last 7 days   Lab Units 04/01/22  1146   WBC Thousand/uL 16 33*   HEMOGLOBIN g/dL 15 5   HEMATOCRIT % 46 3   PLATELETS Thousands/uL 211   NEUTROS ABS Thousands/µL 13 96*     Results from last 7 days   Lab Units 04/01/22  1146   SODIUM mmol/L 134*   POTASSIUM mmol/L 4 2   CHLORIDE mmol/L 102   CO2 mmol/L 28   ANION GAP mmol/L 4   BUN mg/dL 13   CREATININE mg/dL 0 91   EGFR ml/min/1 73sq m 119   CALCIUM mg/dL 9 5     Results from last 7 days   Lab Units 04/01/22  1146   GLUCOSE RANDOM mg/dL 100     Results from last 7 days   Lab Units 04/01/22  1316   CLARITY UA  Clear   COLOR UA  Yellow   SPEC GRAV UA  1 025   PH UA  7 5   GLUCOSE UA mg/dl Negative   KETONES UA mg/dl Trace*   BLOOD UA  Trace*   PROTEIN UA mg/dl Negative   NITRITE UA  Negative   BILIRUBIN UA  Negative   UROBILINOGEN UA E U /dl 1 0   LEUKOCYTES UA  Negative   WBC UA /hpf 1-2   RBC UA /hpf None Seen   BACTERIA UA /hpf None Seen   EPITHELIAL CELLS WET PREP /hpf None Seen   MUCUS THREADS  Occasional*     ED Treatment:   Medication Administration from 04/01/2022 1024 to 04/01/2022 1500       Date/Time Order Dose Route Action     04/01/2022 1147 ondansetron (ZOFRAN) injection 4 mg 4 mg Intravenous Given     04/01/2022 1147 ketorolac (TORADOL) injection 15 mg 15 mg Intravenous Given     04/01/2022 1240 iohexol (OMNIPAQUE) 350 MG/ML injection (SINGLE-DOSE) 100 mL 100 mL Intravenous Given     04/01/2022 1451 cefTRIAXone (ROCEPHIN) 2,000 mg in dextrose 5 % 50 mL IVPB 2,000 mg Intravenous New Bag        Past Medical History:   Diagnosis Date    Herpes stomatitis     Resolved 5/30/2014     Other headache syndrome     Resolved 5/30/2014      Admitting Diagnosis: Abdominal pain [R10 9]  Acute appendicitis, uncomplicated [Q15 15]  Age/Sex: 25 y o  male  Admission Orders:  Scd/foot pumps  Cont pulse ox    Scheduled Medications:    Continuous IV Infusions:  lactated ringers infusion  Freq: Continuous PRN Route: IV  Last Dose: Stopped (04/01/22 1906)  Start: 04/01/22 1501 End: 04/01/22 1640  multi-electrolyte (PLASMALYTE-A/ISOLYTE-S PH 7 4) IV solution  Rate: 100 mL/hr  Start: 04/01/22 1500 End: 04/01/22 2106    PRN Meds:    Network Utilization Review Department  ATTENTION: Please call with any questions or concerns to [phone] and carefully listen to the prompts so that you are directed to the right person  All voicemails are confidential   Chrystine Can all requests for admission clinical reviews, approved or denied determinations and any other requests to dedicated fax number below belonging to the campus where the patient is receiving treatment   List of dedicated fax numbers for the Facilities:  [address] DENIALS (Administrative/Medical Necessity) [phone]   [address] (Maternity/NICU/Pediatrics) [phone] [address] [place] [address]  [address] [facility] [place] [address] [address] [place] [address] [address] [facility] [facility] [phone]

## 2022-04-18 ENCOUNTER — OFFICE VISIT (OUTPATIENT)
Dept: SURGERY | Facility: CLINIC | Age: 22
End: 2022-04-18
Payer: COMMERCIAL

## 2022-04-18 VITALS — TEMPERATURE: 97.8 F | HEIGHT: 77 IN | WEIGHT: 226 LBS | BODY MASS INDEX: 26.68 KG/M2

## 2022-04-18 DIAGNOSIS — Z90.49 STATUS POST LAPAROSCOPIC APPENDECTOMY: Primary | ICD-10-CM

## 2022-04-18 PROBLEM — K35.80 ACUTE APPENDICITIS: Status: RESOLVED | Noted: 2022-04-01 | Resolved: 2022-04-18

## 2022-04-18 PROCEDURE — 99212 OFFICE O/P EST SF 10 MIN: CPT | Performed by: SURGERY

## 2022-04-18 NOTE — PROGRESS NOTES
Office Visit - General Surgery  Yen Muhammad MRN: [id number]  Encounter: [id number]    Assessment and Plan  Problem List Items Addressed This Visit        Other    Status post laparoscopic appendectomy - Primary     - cleared for return to work with light duty, no heavy lifting, nothing >15 lbs  - may return to full duty work in 4 weeks, work note given  - return to surgery clinic PRN if issues               Chief Complaint:  Yen Muhammad is a 25 y o  male who presents for Post-op (p/o appendectomy)    Subjective  Patient is postoperative day 17 from [facility] for acute appendicitis with Dr Norm Pérez  His pathology was significant for acute appendicitis which was reviewed with patient today during visit  He denies any further abdominal pain and does not require pain medications  He denies fever/chills, nausea/vomiting, dysuria  He is having BM  He works at [employer] and has been back at work lifting nothing heavier than 15 lbs  He states he tried to do more than that and had mild pain at umbilicus so stopped  Past Medical History:   Diagnosis Date    Herpes stomatitis     Resolved 5/30/2014     Other headache syndrome     Resolved 5/30/2014        Past Surgical History:   Procedure Laterality Date    APPENDECTOMY LAPAROSCOPIC N/A 4/1/2022    Procedure: APPENDECTOMY LAPAROSCOPIC open appendectomy;  Surgeon:  Madelyn Pastraan DO;  Location: [facility];  Service: General       Family History   Problem Relation Age of Onset    No Known Problems Mother     No Known Problems Father        Social History     Tobacco Use    Smoking status: Never Smoker    Smokeless tobacco: Never Used   Vaping Use    Vaping Use: Never used   Substance Use Topics    Alcohol use: Yes     Comment: occassioanl    Drug use: Yes     Types: Marijuana     Comment: synthetic marjuana        Medications  Current Outpatient Medications on File Prior to Visit   Medication Sig Dispense Refill    benzocaine (HURRICAINE) 20 % 1 application by Mucosal route 4 (four) times a day as needed for mucositis (Patient not taking: Reported on 1/28/2020) 1 each 0    dicyclomine (BENTYL) 20 mg tablet Take 1 tablet (20 mg total) by mouth 2 (two) times a day (Patient not taking: Reported on 10/13/2021) 20 tablet 0    loperamide (IMODIUM) 2 mg capsule Take 1 capsule (2 mg total) by mouth 4 (four) times a day as needed for diarrhea (Patient not taking: Reported on 10/13/2021) 12 capsule 0     Current Facility-Administered Medications on File Prior to Visit   Medication Dose Route Frequency Provider Last Rate Last Admin    morphine (PF) 4 mg/mL injection 4 mg  4 mg Intravenous Once Rey Ochoa PA-C           Allergies  No Known Allergies    Review of Systems   Constitutional: Negative for chills and fever  HENT: Negative for ear pain and sore throat  Eyes: Negative for pain and visual disturbance  Respiratory: Negative for cough and shortness of breath  Cardiovascular: Negative for chest pain and palpitations  Gastrointestinal: Negative for abdominal pain, constipation, diarrhea, nausea and vomiting  Genitourinary: Negative for dysuria and hematuria  Musculoskeletal: Negative for arthralgias and back pain  Skin: Negative for color change and rash  Neurological: Negative for seizures and syncope  All other systems reviewed and are negative  Objective  Vitals:    04/18/22 0934   Temp: 97 8 °F (36 6 °C)       Physical Exam  Constitutional:       General: He is not in acute distress  Appearance: He is well-developed  He is not diaphoretic  HENT:      Head: Normocephalic and atraumatic  Mouth/Throat:      Mouth: Mucous membranes are moist    Eyes:      General: No scleral icterus  Pupils: Pupils are equal, round, and reactive to light  Neck:      Vascular: No JVD  Cardiovascular:      Rate and Rhythm: Normal rate and regular rhythm  Heart sounds: Normal heart sounds     Pulmonary:      Effort: Pulmonary effort is normal  No respiratory distress  Breath sounds: Normal breath sounds  No stridor  Abdominal:      General: There is no distension  Palpations: Abdomen is soft  Tenderness: There is no abdominal tenderness  There is no guarding or rebound  Comments: Incisions cdi healing well with histoacryl which is beginning to flake off   Musculoskeletal:         General: No swelling or tenderness  Skin:     General: Skin is warm and dry  Capillary Refill: Capillary refill takes less than 2 seconds  Coloration: Skin is not jaundiced or pale  Neurological:      Mental Status: He is alert and oriented to person, place, and time  Cranial Nerves: No cranial nerve deficit     Psychiatric:         Mood and Affect: Mood normal

## 2022-04-18 NOTE — ASSESSMENT & PLAN NOTE
- cleared for return to work with light duty, no heavy lifting, nothing >15 lbs  - may return to full duty work in 4 weeks, work note given  - return to surgery clinic PRN if issues

## 2022-04-18 NOTE — LETTER
April 18, 2022     Patient: Familia Graves  YOB: 2000  Date of Visit: 4/18/2022      To Whom it May Concern:    Andreina Tsang is under my professional care  Troy Jefferson was seen in my office on 4/18/2022  Troy Jefferson may return to work with limitations of light duty, no heavy lifting more than 15 lbs  He may return to full duty work on 5/16/2022  If you have any questions or concerns, please don't hesitate to call           Sincerely,          [facility]        CC: Familia Graves

## 2022-05-16 ENCOUNTER — TELEPHONE (OUTPATIENT)
Dept: FAMILY MEDICINE CLINIC | Facility: CLINIC | Age: 22
End: 2022-05-16

## 2022-05-16 NOTE — TELEPHONE ENCOUNTER
----- Message from Ney Schmid sent at 5/16/2022 11:04 AM EDT -----  Patient has not been seen in office since 2019, please start patient attribution  Thank you!

## 2022-11-08 PROBLEM — E78.2 MIXED HYPERLIPIDEMIA: Status: ACTIVE | Noted: 2022-11-08

## 2022-11-08 PROBLEM — R76.8 OTHER SPECIFIED ABNORMAL IMMUNOLOGICAL FINDINGS IN SERUM: Status: ACTIVE | Noted: 2022-11-08

## 2022-11-08 PROBLEM — Z78.9 CURRENT DRINKER OF ALCOHOL: Status: ACTIVE | Noted: 2022-11-08

## 2022-11-08 PROBLEM — B20 HUMAN IMMUNODEFICIENCY VIRUS INFECTION (HCC): Status: ACTIVE | Noted: 2021-02-03

## 2022-11-08 PROBLEM — F41.8 MIXED ANXIETY DEPRESSIVE DISORDER: Status: ACTIVE | Noted: 2022-11-08

## 2022-11-08 PROBLEM — E87.5 HYPERKALEMIA: Status: ACTIVE | Noted: 2022-11-08

## 2022-11-08 PROBLEM — F12.90 MARIJUANA USER: Status: ACTIVE | Noted: 2022-11-08

## 2022-11-08 PROBLEM — R76.8 POSITIVE CMV IGG SEROLOGY: Status: ACTIVE | Noted: 2022-11-08

## 2022-11-08 PROBLEM — E66.3 OVERWEIGHT WITH BODY MASS INDEX (BMI) 25.0-29.9: Status: ACTIVE | Noted: 2022-11-08

## 2022-11-08 PROBLEM — Z01.84 IMMUNITY TO MEASLES, MUMPS, AND RUBELLA DETERMINED BY SEROLOGIC TEST: Status: ACTIVE | Noted: 2022-11-08

## 2022-11-08 PROBLEM — A56.3 CHLAMYDIA TRACHOMATIS INFECTION OF ANUS AND RECTUM: Status: ACTIVE | Noted: 2022-11-08

## 2022-11-08 PROBLEM — E78.00 PURE HYPERCHOLESTEROLEMIA: Status: ACTIVE | Noted: 2022-11-08

## 2022-11-08 PROBLEM — R03.0 ELEVATED BLOOD-PRESSURE READING WITHOUT DIAGNOSIS OF HYPERTENSION: Status: ACTIVE | Noted: 2022-11-08

## 2022-11-08 PROBLEM — R82.90 ABNORMAL URINALYSIS: Status: ACTIVE | Noted: 2022-11-08

## 2022-11-08 PROBLEM — T74.21XA SEXUAL ASSAULT OF ADULT: Status: ACTIVE | Noted: 2022-11-08

## 2022-11-08 PROBLEM — E55.9 VITAMIN D DEFICIENCY: Status: ACTIVE | Noted: 2022-11-08

## 2022-11-08 PROBLEM — B00.9 HERPESVIRAL INFECTION, UNSPECIFIED: Status: ACTIVE | Noted: 2022-11-08

## 2022-11-08 PROBLEM — R11.0 NAUSEA: Status: RESOLVED | Noted: 2022-04-01 | Resolved: 2022-11-08

## 2022-11-08 PROBLEM — Z20.5 CONTACT WITH AND (SUSPECTED) EXPOSURE TO VIRAL HEPATITIS: Status: ACTIVE | Noted: 2021-02-01

## 2023-04-28 ENCOUNTER — OFFICE VISIT (OUTPATIENT)
Dept: FAMILY MEDICINE CLINIC | Facility: CLINIC | Age: 23
End: 2023-04-28

## 2023-04-28 VITALS
BODY MASS INDEX: 26.18 KG/M2 | DIASTOLIC BLOOD PRESSURE: 73 MMHG | WEIGHT: 215 LBS | SYSTOLIC BLOOD PRESSURE: 143 MMHG | OXYGEN SATURATION: 100 % | HEART RATE: 97 BPM | HEIGHT: 76 IN | RESPIRATION RATE: 16 BRPM

## 2023-04-28 DIAGNOSIS — Z11.59 NEED FOR HEPATITIS C SCREENING TEST: ICD-10-CM

## 2023-04-28 DIAGNOSIS — Z23 ENCOUNTER FOR IMMUNIZATION: ICD-10-CM

## 2023-04-28 DIAGNOSIS — Z00.00 ANNUAL PHYSICAL EXAM: Primary | ICD-10-CM

## 2023-04-28 DIAGNOSIS — Z11.1 SCREENING FOR TUBERCULOSIS: ICD-10-CM

## 2023-04-28 DIAGNOSIS — J30.2 SEASONAL ALLERGIES: ICD-10-CM

## 2023-04-28 RX ORDER — FLUTICASONE PROPIONATE 50 MCG
1 SPRAY, SUSPENSION (ML) NASAL DAILY
Qty: 9.9 ML | Refills: 2 | Status: SHIPPED | OUTPATIENT
Start: 2023-04-28

## 2023-04-28 NOTE — PROGRESS NOTES
[address] [place] [address]    NAME: Vinay Llanes  AGE: 21 y o  SEX: male  : 2000     DATE: 2023     Assessment and Plan:     Problem List Items Addressed This Visit    None  Visit Diagnoses     Annual physical exam    -  Primary    Seasonal allergies        Relevant Medications    fluticasone (FLONASE) 50 mcg/act nasal spray    Screening for tuberculosis        Relevant Orders    Quantiferon TB Gold Plus    Need for hepatitis C screening test        Relevant Orders    Hepatitis C Antibody    Encounter for immunization        Relevant Orders    TDAP VACCINE GREATER THAN OR EQUAL TO 6YO IM (Completed)          Immunizations and preventive care screenings were discussed with patient today  Appropriate education was printed on patient's after visit summary  Counseling:    Alcohol: drinks socially  Marijuana use socially      No follow-ups on file  Chief Complaint:     Chief Complaint   Patient presents with   • Physical Exam     Annual physical      History of Present Illness:     Adult Annual Physical   Patient here for a comprehensive physical exam  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 and needs a physical  He has a history HIV diagnosed in   His ID Dr Henny Dalal  Needs tdap vaccine today  Diet and Physical Activity  · Diet/Nutrition: well balanced diet, consuming 3-5 servings of fruits/vegetables daily and adequate fiber intake  · Exercise: walking, 5-7 times a week on average and more than 2 hours on average        Depression Screening  PHQ-2/9 Depression Screening    Little interest or pleasure in doing things: 0 - not at all  Feeling down, depressed, or hopeless: 0 - not at all  Trouble falling or staying asleep, or sleeping too much: 0 - not at all  Feeling tired or having little energy: 0 - not at all  Poor appetite or overeatin - not at all  Feeling bad about yourself - or that you are a failure or have let yourself or your family down: 0 - not at all  Trouble concentrating on things, such as reading the newspaper or watching television: 0 - not at all  Moving or speaking so slowly that other people could have noticed  Or the opposite - being so fidgety or restless that you have been moving around a lot more than usual: 0 - not at all  Thoughts that you would be better off dead, or of hurting yourself in some way: 0 - not at all  PHQ-9 Score: 0   PHQ-9 Interpretation: No or Minimal depression        General Health  · Sleep: gets 7-8 hours of sleep on average  · Hearing: normal - bilateral   · Vision: wears glasses  · Dental: regular dental visits  [facility]  · History of STDs?: no      Review of Systems:     Review of Systems   Constitutional: Negative for activity change, appetite change, chills, fatigue and fever  HENT: Negative for congestion, postnasal drip and sore throat  Respiratory: Negative for cough, shortness of breath and wheezing  Cardiovascular: Negative for chest pain  Gastrointestinal: Negative for abdominal pain, constipation, diarrhea, nausea and vomiting  Skin: Negative for rash  Neurological: Negative for weakness, light-headedness and headaches  Psychiatric/Behavioral: The patient is not nervous/anxious  Past Medical History:     Past Medical History:   Diagnosis Date   • Herpes stomatitis     Resolved 5/30/2014    • Other headache syndrome     Resolved 5/30/2014       Past Surgical History:     Past Surgical History:   Procedure Laterality Date   • APPENDECTOMY LAPAROSCOPIC N/A 4/1/2022    Procedure: APPENDECTOMY LAPAROSCOPIC open appendectomy;  Surgeon:  Mary Mercado DO;  Location: [facility];  Service: General      Social History:     Social History     Socioeconomic History   • Marital status: Single     Spouse name: None   • Number of children: None   • Years of education: None   • Highest education level: None   Occupational History   • None Tobacco Use   • Smoking status: Never   • Smokeless tobacco: Never   Vaping Use   • Vaping Use: Never used   Substance and Sexual Activity   • Alcohol use: Yes     Comment: occassioanl   • Drug use: Yes     Types: Marijuana     Comment: synthetic marjuana   • Sexual activity: None   Other Topics Concern   • None   Social History Narrative    Currently in school      Social Determinants of Health     Financial Resource Strain: Not on file   Food Insecurity: Not on file   Transportation Needs: Not on file   Physical Activity: Not on file   Stress: Not on file   Social Connections: Not on file   Intimate Partner Violence: Not on file   Housing Stability: Not on file      Family History:     Family History   Problem Relation Age of Onset   • No Known Problems Mother    • No Known Problems Father       Current Medications:     Current Outpatient Medications   Medication Sig Dispense Refill   • bictegravir-emtricitab-tenofovir alafenamide (Biktarvy) -25 MG tablet Take 1 Dose by mouth every 24 hours     • fluticasone (FLONASE) 50 mcg/act nasal spray 1 spray into each nostril daily 9 9 mL 2   • benzocaine (HURRICAINE) 20 % 1 application by Mucosal route 4 (four) times a day as needed for mucositis (Patient not taking: Reported on 1/28/2020) 1 each 0   • Cholecalciferol 1 25 MG (44396 UT) capsule Take 1 Dose by mouth once a week     • dicyclomine (BENTYL) 20 mg tablet Take 1 tablet (20 mg total) by mouth 2 (two) times a day (Patient not taking: Reported on 10/13/2021) 20 tablet 0   • loperamide (IMODIUM) 2 mg capsule Take 1 capsule (2 mg total) by mouth 4 (four) times a day as needed for diarrhea (Patient not taking: Reported on 10/13/2021) 12 capsule 0     No current facility-administered medications for this visit       Facility-Administered Medications Ordered in Other Visits   Medication Dose Route Frequency Provider Last Rate Last Admin   • morphine (PF) 4 mg/mL injection 4 mg  4 mg Intravenous Once Lazaro [facility] "JUDI Ochoa          Allergies:     No Known Allergies   Physical Exam:     /73   Pulse 97   Resp 16   Ht 6' 3 5\" (1 918 m)   Wt 97 5 kg (215 lb)   SpO2 100%   BMI 26 52 kg/m²     Physical Exam  Constitutional:       Appearance: Normal appearance  HENT:      Head: Normocephalic and atraumatic  Right Ear: Tympanic membrane, ear canal and external ear normal       Left Ear: Tympanic membrane, ear canal and external ear normal       Nose: Nose normal       Mouth/Throat:      Mouth: Mucous membranes are moist    Cardiovascular:      Rate and Rhythm: Normal rate and regular rhythm  Pulses: Normal pulses  Heart sounds: Normal heart sounds  Pulmonary:      Effort: Pulmonary effort is normal       Breath sounds: Normal breath sounds  Abdominal:      General: Bowel sounds are normal  There is no distension  Tenderness: There is no abdominal tenderness  Musculoskeletal:         General: Normal range of motion  Skin:     General: Skin is warm  Neurological:      General: No focal deficit present  Mental Status: He is alert and oriented to person, place, and time  Psychiatric:         Mood and Affect: Mood normal          Behavior: Behavior normal          Thought Content:  Thought content normal          Judgment: Judgment normal           Rosie Ortiz MD   [address]  "

## 2023-05-03 LAB — HCV AB S/CO SERPL IA: NON REACTIVE

## 2023-06-23 ENCOUNTER — TELEPHONE (OUTPATIENT)
Dept: FAMILY MEDICINE CLINIC | Facility: CLINIC | Age: 23
End: 2023-06-23

## 2023-06-23 NOTE — TELEPHONE ENCOUNTER
[facility] into encounter    Placed in red clinical folder    [facility] [phone]

## 2023-06-26 NOTE — TELEPHONE ENCOUNTER
Patient is calling to check the status of Flu Exemption form as he does need ASAP for clinicals  Patient is aware of 4-05 day policy

## 2023-09-13 ENCOUNTER — TELEPHONE (OUTPATIENT)
Dept: PSYCHIATRY | Facility: CLINIC | Age: 23
End: 2023-09-13
